# Patient Record
Sex: FEMALE | Race: WHITE | NOT HISPANIC OR LATINO | Employment: OTHER | URBAN - METROPOLITAN AREA
[De-identification: names, ages, dates, MRNs, and addresses within clinical notes are randomized per-mention and may not be internally consistent; named-entity substitution may affect disease eponyms.]

---

## 2023-03-08 LAB — SARS-COV-2 RESULT: NOT DETECTED

## 2024-08-01 ENCOUNTER — CLINICAL SUPPORT (OUTPATIENT)
Dept: PHYSICAL THERAPY | Facility: CLINIC | Age: 69
End: 2024-08-01
Payer: MEDICARE

## 2024-08-01 DIAGNOSIS — H81.10 BENIGN PAROXYSMAL VERTIGO, UNSPECIFIED EAR: ICD-10-CM

## 2024-08-01 PROCEDURE — 97530 THERAPEUTIC ACTIVITIES: CPT | Mod: GP

## 2024-08-01 PROCEDURE — 97161 PT EVAL LOW COMPLEX 20 MIN: CPT | Mod: GP

## 2024-08-01 ASSESSMENT — PATIENT HEALTH QUESTIONNAIRE - PHQ9
1. LITTLE INTEREST OR PLEASURE IN DOING THINGS: NOT AT ALL
2. FEELING DOWN, DEPRESSED OR HOPELESS: NOT AT ALL
SUM OF ALL RESPONSES TO PHQ9 QUESTIONS 1 AND 2: 0

## 2024-08-01 NOTE — PROGRESS NOTES
Physical Therapy    Physical Therapy Evaluation and Treatment      Patient Name: Dede Looney  MRN: [id number]  Today's Date: 8/1/2024  Time Calculation  Start Time: 1400  Stop Time: 1440  Time Calculation (min): 40 min    Insurance - reviewed   Visit number: 1 (updated 08/01/24)   Payor: [employer] MEDICARE / Plan: [employer] GOLD CHOICE / Product Type: *No Product type* /    $40 COPAY VISITS ARE MED NEC NEEDS AUTH SPENSER PAYS % OOP 4500.00 110.63 APPLIED   Referring Provider: Lin Aggarwal*       Assessment:  Dede Looney is a 68 y.o. old who presents to skilled vestibular physical therapy evaluation with intermittent dizziness which she describes as vertigo associated with positional changes. Negative tests for positional vertigo today, but subjective consistent with likely resolved posterior canalithiasis. 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   No other abnormalities noted with oculomotor assessment today and the remainder of the office based vestibular examination appeared normal. her impairments include: intermittent dizziness with associated decreased functional mobility.   Due to these impairments, she has the following functional limitations and participation restrictions:  increased fall risk and difficulty with completing/performing some ADLs/IADLs when symptomatic.  Dede's clinical presentation is Stable and/or uncomplicated characteristics with the level of complexity being low Dede's potential for rehab is good.  Skilled vestibular PT warranted to address intermittent dizziness if it returns in order to improve Dede Looney's functional independence and safety at home and in the community as well as decrease fall risk. Skilled physical therapy services are appropriate and beneficial in order to achieve measurable and meaningful change in the objective tests and measures. Utilization of skilled physical therapy services will aid in advancing her functional  status and attaining her therapy-related goals. Currently Dede will monitor for return of dizziness and she is on hold from PT at this time as she declined balance assessment due to no deficits reported and no unsteadiness observed.  Dede verbalized understanding and is in agreement with all goals and plan of care.  Dede left session with all questions answered and no increase in symptoms.        Plan:  OP PT Plan  Treatment/Interventions: Canalith repositioning, Education/ Instruction, Gait training, Neuromuscular re-education, Therapeutic activities, Therapeutic exercises  PT Plan: Skilled PT  PT Frequency:  (1-2 times per week as needed based on dizziness symptoms)  Duration: 2-12 weeks  Rehab Potential: Good  Plan of Care Agreement: Patient    Current Problem:   Problem List Items Addressed This Visit    None  Visit Diagnoses         Codes    Benign paroxysmal vertigo, unspecified ear     H81.10              Subjective    General:       Dede Looney  is a 68 y.o. female  presenting to the clinic with chief complaint of intermittent dizziness which she describes as vertigo associated with positional changes. Symptoms last for seconds.  Has hx of BPPV with CRP 4/26/23 which was successful.     Dede Looney  reports symptoms began 1-2 years ago.    Reports symptoms are better with staying still.     Reports symptoms are worse with bending over, rolling in bed    Dede Looney  denies:  numbness, tingling, diplopia, drop attacks, dysarthria, and dysphagia    Dede Looney  confirms: intermittent dizziness    Prior Treatment/diagnostic images: CRP 4/2023    Medical History Form: Reviewed (scanned into chart)    Living Environment: single level house with basement laundry.  Lives alone    Occupation: Retired      Prior Level of Function: IND    Functional Limitations: limits activity when dizzy    Pt goals for therapy:  to decrease dizziness    Precautions:  Fall Risk: Low   Denies: Cancer, Pacemaker, Diabetes,  Hypertension, latex allergy, epilepsy/seizures, other known cardiac problems, other known neurologic problem, and other known pulmonary problems  Past Surgical history: s/p R TSA DOS: 3/9/23, thyroid removed 1997, tonsils  Past Medical history: osteopenia  Denies any eye problems/hx of lazy eye, denies previous ear sx, pain in ear or current tinnitus.    Pain:  0/10      Objective     Oculomotor Exam:   Extraocular ROM = WNL  Smooth pursuits = WNL  Horizontal saccades = WNL  Vertical saccades = WNL  Convergence = WNL  Cover/uncover = WNL  Cross Cover = WNL  Spontaneous Nystagmus (with goggles) = negative  Gaze Evoked Nystagmus (with goggles) = negative  VOR =WNL      Positional Testing: with goggles  Wilfrid-Hallpike Right = negative   Wilfrid-Hallpike Left = negative   Horizontal Roll Test Right = negative   Horizontal Roll Test Left = negative  Side Lying Test Right = negative   Side Lying Test Left = negative     Gait: IND without major deviation or unsteadiness.    Transfers: IND    Myotomes B UE/LE: observed >/= 3/5 throughout and symmetrical    B UE/LE AROM: observed symmetrical and WFL during myotome testing     Cervical AROM:  symmetrical and WFL without symptoms    Outcome Measures:  Other Measures  Dizziness Handicap Inventory: 20     Treatments:    Therapeutic Activity: 15 minutes  Educated on Role of PT and PT POC.  Educated Dede regarding benefit and purpose of skilled vestibular PT services along with results of examination findings and how this correlates to their chief complaint.   Answered Dede Looney's questions in full.  Reviewed relevant anatomy using model.  Reviewed pathophysiology of BPPV using model, rationale for CRP.  Dede to use caution with self CRP at home as she used [employer] videos in the past, potential for multiple canal involvement.  Educated on CPG recommendations for BPPV.  Risk factors associated with INC prevalence of BPPV, possibly that it can return.  Recent evidence that vitamin D  and calcium supplementation reduces recurrences of BPPV especially when serum vitamin D is subnormal. (John SH et al, 2020).  Reviewed last vit D which was WNL.      EDUCATION:  Outpatient Education  Individual(s) Educated: Patient  Education Provided: Anatomy, Fall Risk, POC, Physiology  Risk and Benefits Discussed with Patient/Caregiver/Other: yes  Patient/Caregiver Demonstrated Understanding: yes  Plan of Care Discussed and Agreed Upon: yes  Patient Response to Education: Patient/Caregiver Verbalized Understanding of Information    Goals:      STG's (within 2 weeks)    Dede L Boots will report < 3 occurrences of positional vertigo for one week with daily activities.    LTG's (by discharge)    Dede L Boots will test negative for positional vertigo.    Dede L Boots will report no complaint of positional imbalance or vertigo for one week with daily activities.    Dede L Boots will decrease DHI by >/= 18 points (minimally clinically important difference) or maintain </=34 points (16-34 Points is a mild handicap) in order to improve safety at home and decrease falls risk. Baseline 20/100      Time Calculation  Start Time: 1400  Stop Time: 1440  Time Calculation (min): 40 min  PT Evaluation Time Entry  PT Evaluation (Low) Time Entry: 25  PT Therapeutic Procedures Time Entry  Therapeutic Activity Time Entry: 15

## 2024-08-20 ENCOUNTER — APPOINTMENT (OUTPATIENT)
Dept: PHYSICAL THERAPY | Facility: CLINIC | Age: 69
End: 2024-08-20
Payer: MEDICARE

## 2025-07-08 ENCOUNTER — APPOINTMENT (OUTPATIENT)
Dept: ORTHOPEDIC SURGERY | Facility: CLINIC | Age: 70
End: 2025-07-08
Payer: MEDICARE